# Patient Record
Sex: MALE | Race: WHITE | Employment: OTHER | URBAN - METROPOLITAN AREA
[De-identification: names, ages, dates, MRNs, and addresses within clinical notes are randomized per-mention and may not be internally consistent; named-entity substitution may affect disease eponyms.]

---

## 2024-11-12 ENCOUNTER — ANESTHESIA EVENT (OUTPATIENT)
Facility: HOSPITAL | Age: 69
End: 2024-11-12
Payer: MEDICARE

## 2024-11-12 ENCOUNTER — ANESTHESIA (OUTPATIENT)
Facility: HOSPITAL | Age: 69
End: 2024-11-12
Payer: MEDICARE

## 2024-11-12 ENCOUNTER — HOSPITAL ENCOUNTER (OUTPATIENT)
Facility: HOSPITAL | Age: 69
Setting detail: OUTPATIENT SURGERY
Discharge: HOME | End: 2024-11-12
Attending: SPECIALIST | Admitting: SPECIALIST
Payer: MEDICARE

## 2024-11-12 VITALS
BODY MASS INDEX: 24.58 KG/M2 | SYSTOLIC BLOOD PRESSURE: 110 MMHG | DIASTOLIC BLOOD PRESSURE: 75 MMHG | HEIGHT: 72 IN | TEMPERATURE: 98.3 F | OXYGEN SATURATION: 97 % | WEIGHT: 181.44 LBS | RESPIRATION RATE: 19 BRPM | HEART RATE: 63 BPM

## 2024-11-12 LAB
GLUCOSE BLD STRIP.AUTO-MCNC: 128 MG/DL (ref 65–117)
SERVICE CMNT-IMP: ABNORMAL

## 2024-11-12 PROCEDURE — 82962 GLUCOSE BLOOD TEST: CPT

## 2024-11-12 PROCEDURE — 6360000002 HC RX W HCPCS: Performed by: NURSE ANESTHETIST, CERTIFIED REGISTERED

## 2024-11-12 PROCEDURE — 3600007512: Performed by: SPECIALIST

## 2024-11-12 PROCEDURE — 3700000001 HC ADD 15 MINUTES (ANESTHESIA): Performed by: SPECIALIST

## 2024-11-12 PROCEDURE — 7100000011 HC PHASE II RECOVERY - ADDTL 15 MIN: Performed by: SPECIALIST

## 2024-11-12 PROCEDURE — 7100000010 HC PHASE II RECOVERY - FIRST 15 MIN: Performed by: SPECIALIST

## 2024-11-12 PROCEDURE — 3700000000 HC ANESTHESIA ATTENDED CARE: Performed by: SPECIALIST

## 2024-11-12 PROCEDURE — 3600007502: Performed by: SPECIALIST

## 2024-11-12 PROCEDURE — 88305 TISSUE EXAM BY PATHOLOGIST: CPT

## 2024-11-12 RX ORDER — LISINOPRIL AND HYDROCHLOROTHIAZIDE 20; 25 MG/1; MG/1
1 TABLET ORAL
Status: ON HOLD | COMMUNITY
Start: 2024-08-13 | End: 2024-11-11

## 2024-11-12 RX ORDER — ATORVASTATIN CALCIUM 20 MG/1
20 TABLET, FILM COATED ORAL
Status: ON HOLD | COMMUNITY
Start: 2024-08-13 | End: 2024-11-11

## 2024-11-12 RX ORDER — SODIUM CHLORIDE 9 MG/ML
INJECTION, SOLUTION INTRAVENOUS CONTINUOUS
Status: DISCONTINUED | OUTPATIENT
Start: 2024-11-12 | End: 2024-11-12 | Stop reason: HOSPADM

## 2024-11-12 RX ORDER — SODIUM CHLORIDE 0.9 % (FLUSH) 0.9 %
5-40 SYRINGE (ML) INJECTION PRN
Status: DISCONTINUED | OUTPATIENT
Start: 2024-11-12 | End: 2024-11-12 | Stop reason: HOSPADM

## 2024-11-12 RX ORDER — SIMETHICONE 40MG/0.6ML
40 SUSPENSION, DROPS(FINAL DOSAGE FORM)(ML) ORAL AS NEEDED
Status: DISCONTINUED | OUTPATIENT
Start: 2024-11-12 | End: 2024-11-12 | Stop reason: HOSPADM

## 2024-11-12 RX ORDER — PROPOFOL 10 MG/ML
INJECTION, EMULSION INTRAVENOUS
Status: DISCONTINUED | OUTPATIENT
Start: 2024-11-12 | End: 2024-11-12 | Stop reason: SDUPTHER

## 2024-11-12 RX ADMIN — PROPOFOL 150 MCG/KG/MIN: 10 INJECTION, EMULSION INTRAVENOUS at 10:08

## 2024-11-12 RX ADMIN — PROPOFOL 100 MG: 10 INJECTION, EMULSION INTRAVENOUS at 10:11

## 2024-11-12 RX ADMIN — PROPOFOL 100 MG: 10 INJECTION, EMULSION INTRAVENOUS at 10:07

## 2024-11-12 ASSESSMENT — PAIN - FUNCTIONAL ASSESSMENT: PAIN_FUNCTIONAL_ASSESSMENT: 0-10

## 2024-11-12 ASSESSMENT — PAIN SCALES - GENERAL
PAINLEVEL_OUTOF10: 0

## 2024-11-12 NOTE — PERIOP NOTE
Initial RN admission and assessment performed and documented in Endoscopy navigator.     Patient evaluated by anesthesia in pre-procedure holding.     All procedural vital signs, airway assessment, and level of consciousness information monitored and recorded by anesthesia staff on the anesthesia record.     Report received from CRNA post procedure.  Patient transported to recovery area by RN.    Endoscopy post procedure time out was performed and specimens were verified with physician.    Endoscope was pre-cleaned at bedside immediately following procedure by Jaime Barber.

## 2024-11-12 NOTE — OP NOTE
[facility]  Formerly [facility]  Lit Lr MD  [phone]      2024    Colonoscopy Procedure Note  James Siemens  :  1955  [place] Medical Record Number: [id number]    Indications:     Personal history of colon polyps (screening only)  PCP:  Joseph Chapa MD  Anesthesia/Sedation: Conscious Sedation/Moderate Sedation/GETA, see notes  Endoscopist:  Dr. Lit Lr  Complications:  None  Estimated Blood Loss:  None    Permit:  The indications, risks, benefits and alternatives were reviewed with the patient or their decision maker who was provided an opportunity to ask questions and all questions were answered.  The specific risks of colonoscopy with conscious sedation were reviewed, including but not limited to anesthetic complication, bleeding, adverse drug reaction, missed lesion, infection, IV site reactions, and intestinal perforation which would lead to the need for surgical repair.  Alternatives to colonoscopy including radiographic imaging, observation without testing, or laboratory testing were reviewed including the limitations of those alternatives.  After considering the options and having all their questions answered, the patient or their decision maker provided both verbal and written consent to proceed.        Procedure in Detail:  After obtaining informed consent, positioning of the patient in the left lateral decubitus position, and conduction of a pre-procedure pause or \"time out\" the endoscope was introduced into the anus and advanced to the cecum, which was identified by the ileocecal valve and appendiceal orifice.  The quality of the colonic preparation was good.  A careful inspection was made as the colonoscope was withdrawn, findings and interventions are described below.  BBPS 2,3,3    Findings:   There is diverticulosis in the sigmoid, descending, transverse, ascending without

## 2024-11-12 NOTE — H&P
69 y.o. male for open access colonoscopy for screening   Additional data for completion of the targeted pre-endoscopy H&P will be provided under 'H&P interval notes'.  Please see that document which will be attached to this.  Lit Lr MD    Last colonoscopy [facility] 2017 Personal history of colon polyps.

## 2024-11-12 NOTE — H&P
Pre-Endoscopy H&P Update  Chief complaint/HPI/ROS:  The indication for the procedure, the patient's history and the patient's current medications are reviewed prior to the procedure and that data is reported on the H&P to which this document is attached.  Any significant complaints with regard to organ systems will be noted, and if not mentioned then a review of systems is not contributory.  Past Medical History:   Diagnosis Date    Diabetes mellitus (HCC)     Hyperlipidemia     Hypertension       Past Surgical History:   Procedure Laterality Date    CATARACT REMOVAL Bilateral      Social   Social History     Tobacco Use    Smoking status: Never    Smokeless tobacco: Never   Substance Use Topics    Alcohol use: Not on file     Comment: occasionally      History reviewed. No pertinent family history.   No Known Allergies   Prior to Admission Medications   Prescriptions Last Dose Informant Patient Reported? Taking?   atorvastatin (LIPITOR) 20 MG tablet 11/11/2024  Yes Yes   Sig: Take 1 tablet by mouth   lisinopril-hydroCHLOROthiazide (PRINZIDE;ZESTORETIC) 20-25 MG per tablet 11/11/2024  Yes Yes   Sig: Take 1 tablet by mouth   metFORMIN (GLUCOPHAGE) 500 MG tablet 11/10/2024  Yes Yes   Sig: Take 1 tablet by mouth daily (with breakfast)      Facility-Administered Medications: None       PHYSICAL EXAM:  The patient is examined immediately prior to the procedure.  Vitals:    11/12/24 0926   BP: (!) 146/74   Pulse: 85   Resp: 19   Temp: 98.1 °F (36.7 °C)   SpO2: 96%     Gen: Appears comfortable, no distress.  Pulm: comfortable respirations with no abnormal audible breath sounds  HEART: well perfused, no abnormal audible heart sounds  GI: abdomen flat.    PLAN:  Informed consent discussion held, patient afforded an opportunity to ask questions and all questions answered.  After being advised of the risks, benefits, and alternatives, the patient requested that we proceed and indicated so on a written consent form.      Will

## 2024-11-12 NOTE — PROGRESS NOTES
James Siemens  1955  [id number]    Situation:  Verbal report received from:  HOWIE Vargas   Procedure: Procedure(s):  COLONOSCOPY  COLONOSCOPY POLYPECTOMY SNARE/BIOPSY    Background:    Preoperative diagnosis: Personal history of colonic polyps [Z86.010]  Postoperative diagnosis: * No post-op diagnosis entered *    :  Dr. Lr   Assistant(s): Circulator: Alicia Snell RN  Endoscopy Technician: Chester Barber    Specimens:   ID Type Source Tests Collected by Time Destination   1 : rectum polyp Tissue Rectum SURGICAL PATHOLOGY Lit Lr MD 11/12/2024 1021      H. Pylori     no    Assessment:  Intra-procedure medications       Anesthesia gave intra-procedure sedation and medications, see anesthesia flow sheet    yes    Intravenous fluids: NS@ KVO     Vital signs stable    yes    Abdominal assessment: round and soft    yes    Recommendation:  Discharge patient per MD order  yes.  Return to floor   outpatient  Family or Friend    spouse  Permission to share finding with family or friend    yes

## 2024-11-12 NOTE — ANESTHESIA POSTPROCEDURE EVALUATION
Department of Anesthesiology  Postprocedure Note    Patient: James Siemens  MRN: [id number]  YOB: 1955  Date of evaluation: 11/12/2024    Procedure Summary       Date: 11/12/24 Room / Location: [facility] ENDO 03 / [facility] ENDOSCOPY    Anesthesia Start: 1004 Anesthesia Stop: 1025    Procedures:       COLONOSCOPY (Lower GI Region)      COLONOSCOPY POLYPECTOMY SNARE/BIOPSY (Lower GI Region) Diagnosis:       Personal history of colonic polyps      (Personal history of colonic polyps [Z86.010])    Surgeons: Lit Lr MD Responsible Provider: Michelle Keenan MD    Anesthesia Type: MAC ASA Status: 2            Anesthesia Type: No value filed.    Lily Phase I: Lily Score: 10    Lily Phase II: Lily Score: 10    Anesthesia Post Evaluation    No notable events documented.

## 2024-11-12 NOTE — DISCHARGE INSTRUCTIONS
[facility]  [facility]  Lit Lr MD  [phone]      November 12, 2024    James Siemens  YOB: 1955    COLONOSCOPY DISCHARGE INSTRUCTIONS    If there is redness at IV site you should apply warm compress to area.  If redness or soreness persist contact Dr. Lr' or your primary care doctor.    There may be a slight amount of blood passed from the rectum.  Gaseous discomfort may develop, but walking, belching will help relieve this.  You may not operate a vehicle for 12 hours  You may not operate machinery or dangerous appliances for rest of today  You may not drink alcoholic beverages for 12 hours  Avoid making any critical decisions for 24 hours    DIET:  You may resume your normal diet, but some patients find that heavy or large meals may lead to indigestion or vomiting.  I suggest a light meal as first food intake.    MEDICATIONS:  The use of some over-the-counter pain medication may lead to bleeding after colon biopsies or polyp removal.  Tylenol (also called acetaminophen) is safe to take even if you have had colonoscopy with polyp removal.  Based on the procedure you had today you may not safely take aspirin or aspirin-like products for the next ten (10) days.  Remember that Tylenol (also called acetaminophen) is safe to take after colonoscopy even if you have had biopsies or polyps removed.    ACTIVITY:  You may resume your normal household activities, but it is recommended that you spend the remainder of the day resting -  avoid any strenuous activity.    CALL DR. LR' OFFICE IF:  Increasing pain, nausea, vomiting  Abdominal distension (swelling)  Significant new or increased bleeding (oral or rectal)  Fever/Chills  Chest pain/shortness of breath                       Additional instructions:   Great news, no colon cancer but we did find and remove one small polyp from the rectum.  I'll reach out with the
